# Patient Record
Sex: FEMALE | Race: WHITE | NOT HISPANIC OR LATINO
[De-identification: names, ages, dates, MRNs, and addresses within clinical notes are randomized per-mention and may not be internally consistent; named-entity substitution may affect disease eponyms.]

---

## 2019-06-10 ENCOUNTER — APPOINTMENT (OUTPATIENT)
Dept: ENDOCRINOLOGY | Facility: CLINIC | Age: 49
End: 2019-06-10
Payer: COMMERCIAL

## 2019-06-10 VITALS
SYSTOLIC BLOOD PRESSURE: 110 MMHG | HEIGHT: 60.5 IN | BODY MASS INDEX: 25.63 KG/M2 | DIASTOLIC BLOOD PRESSURE: 70 MMHG | HEART RATE: 77 BPM | WEIGHT: 134 LBS | OXYGEN SATURATION: 98 %

## 2019-06-10 DIAGNOSIS — R63.5 ABNORMAL WEIGHT GAIN: ICD-10-CM

## 2019-06-10 PROCEDURE — 99214 OFFICE O/P EST MOD 30 MIN: CPT

## 2019-06-10 NOTE — HISTORY OF PRESENT ILLNESS
[FreeTextEntry1] : 49 y.o. female with h/o thyroid nodules diagnosed in 2009 here for follow up visit. Was seen in 2010 with b/l FNA on 2/3/2010. Right dominant nodule c/w multinodular goiter and left dominant nodule c/w multinodular goiter. Thyroid ultrasound on 9/22/10 showed multiple b/l nodules and largest on right 1.2 cm and on left 1.1 cm. Most recent thyroid ultrasound on 7/1/16 showed largest nodule on the right measuring 1.1 cm and largest on the left measuring 1.0 cm. Does report neck tenderness on and off 3 times in 2018 and lasted more than couple of days. Does report increase in frequency during this past winter. No dysphagia and no dysphonia. No head or neck RT exposure. Has been dealing with migraines but better. C/o mild fatigue and attributes to decrease in sleep. No hair loss. Otherwise feeling good. Reports weight gain of 8 pounds since 2016. Does exercise 5 days per week and does running and works with a .

## 2019-06-10 NOTE — REVIEW OF SYSTEMS
[Fatigue] : fatigue [Recent Weight Gain (___ Lbs)] : recent [unfilled] ~Ulb weight gain [Constipation] : constipation [Irregular Menses] : irregular menses [Joint Pain] : joint pain [Headache] : headaches [Hot Flashes] : hot flashes [All other systems negative] : All other systems negative [Dysphagia] : no dysphagia [Dysphonia] : no dysphonia [Swelling] : no swelling

## 2019-06-10 NOTE — ASSESSMENT
[FreeTextEntry1] : 49 y.o. female with h/o thyroid nodules and weight gain.\par 1. Thyroid nodules- Reviewed pathophysiology. Patient appears euthyroid. Will check TFTs and antithyroid antibodies. Will check thyroid ultrasound to evaluate nodules. If stable, will monitor.\par 2. Weight gain- Encouraged carbohydrate consistent diet and exercise. Will check TFTs, lipids and CMP\par \par \par If stable, follow up in 1 year

## 2019-06-10 NOTE — PHYSICAL EXAM
[No Acute Distress] : no acute distress [Alert] : alert [Normal Sclera/Conjunctiva] : normal sclera/conjunctiva [EOMI] : extra ocular movement intact [No Accessory Muscle Use] : no accessory muscle use [No LAD] : no lymphadenopathy [Clear to Auscultation] : lungs were clear to auscultation bilaterally [Normal S1, S2] : normal S1 and S2 [Regular Rhythm] : with a regular rhythm [Normal Bowel Sounds] : normal bowel sounds [Not Tender] : non-tender [Not Distended] : not distended [Soft] : abdomen soft [No Clubbing, Cyanosis] : no clubbing  or cyanosis of the fingernails [Normal Reflexes] : deep tendon reflexes were 2+ and symmetric [No Rash] : no rash [Normal Mood] : the mood was normal [Normal Affect] : the affect was normal [No Edema] : there was no peripheral edema [Acanthosis Nigricans] : no acanthosis nigricans [de-identified] : b/l lower pole nodules

## 2019-06-11 LAB
ALBUMIN SERPL ELPH-MCNC: 4.3 G/DL
ALP BLD-CCNC: 66 U/L
ALT SERPL-CCNC: 13 U/L
ANION GAP SERPL CALC-SCNC: 11 MMOL/L
AST SERPL-CCNC: 16 U/L
BASOPHILS # BLD AUTO: 0.06 K/UL
BASOPHILS NFR BLD AUTO: 0.8 %
BILIRUB SERPL-MCNC: 0.3 MG/DL
BUN SERPL-MCNC: 18 MG/DL
CALCIUM SERPL-MCNC: 9.3 MG/DL
CHLORIDE SERPL-SCNC: 101 MMOL/L
CHOLEST SERPL-MCNC: 167 MG/DL
CHOLEST/HDLC SERPL: 2.5 RATIO
CO2 SERPL-SCNC: 25 MMOL/L
CREAT SERPL-MCNC: 0.64 MG/DL
EOSINOPHIL # BLD AUTO: 0.23 K/UL
EOSINOPHIL NFR BLD AUTO: 3.1 %
GLUCOSE SERPL-MCNC: 82 MG/DL
HCT VFR BLD CALC: 38.7 %
HDLC SERPL-MCNC: 67 MG/DL
HGB BLD-MCNC: 12.7 G/DL
IMM GRANULOCYTES NFR BLD AUTO: 0.3 %
LDLC SERPL CALC-MCNC: 85 MG/DL
LYMPHOCYTES # BLD AUTO: 1.42 K/UL
LYMPHOCYTES NFR BLD AUTO: 19.1 %
MAN DIFF?: NORMAL
MCHC RBC-ENTMCNC: 30.2 PG
MCHC RBC-ENTMCNC: 32.8 GM/DL
MCV RBC AUTO: 91.9 FL
MONOCYTES # BLD AUTO: 0.45 K/UL
MONOCYTES NFR BLD AUTO: 6 %
NEUTROPHILS # BLD AUTO: 5.26 K/UL
NEUTROPHILS NFR BLD AUTO: 70.7 %
PLATELET # BLD AUTO: 288 K/UL
POTASSIUM SERPL-SCNC: 4.1 MMOL/L
PROT SERPL-MCNC: 6.5 G/DL
RBC # BLD: 4.21 M/UL
RBC # FLD: 13 %
SODIUM SERPL-SCNC: 137 MMOL/L
T4 FREE SERPL-MCNC: 1 NG/DL
THYROGLOB AB SERPL-ACNC: <20 IU/ML
THYROPEROXIDASE AB SERPL IA-ACNC: 12 IU/ML
TRIGL SERPL-MCNC: 73 MG/DL
TSH SERPL-ACNC: 0.96 UIU/ML
WBC # FLD AUTO: 7.44 K/UL

## 2020-12-04 ENCOUNTER — APPOINTMENT (OUTPATIENT)
Dept: ENDOCRINOLOGY | Facility: CLINIC | Age: 50
End: 2020-12-04
Payer: COMMERCIAL

## 2020-12-04 VITALS
WEIGHT: 126 LBS | HEIGHT: 60.5 IN | HEART RATE: 73 BPM | SYSTOLIC BLOOD PRESSURE: 110 MMHG | TEMPERATURE: 98 F | BODY MASS INDEX: 24.1 KG/M2 | OXYGEN SATURATION: 98 % | DIASTOLIC BLOOD PRESSURE: 70 MMHG

## 2020-12-04 DIAGNOSIS — G43.909 MIGRAINE, UNSPECIFIED, NOT INTRACTABLE, W/OUT STATUS MIGRAINOSUS: ICD-10-CM

## 2020-12-04 PROCEDURE — 99072 ADDL SUPL MATRL&STAF TM PHE: CPT

## 2020-12-04 PROCEDURE — 99213 OFFICE O/P EST LOW 20 MIN: CPT

## 2020-12-04 NOTE — PHYSICAL EXAM
[Alert] : alert [No Acute Distress] : no acute distress [Normal Sclera/Conjunctiva] : normal sclera/conjunctiva [EOMI] : extra ocular movement intact [No LAD] : no lymphadenopathy [Thyroid Not Enlarged] : the thyroid was not enlarged [No Respiratory Distress] : no respiratory distress [Clear to Auscultation] : lungs were clear to auscultation bilaterally [Normal S1, S2] : normal S1 and S2 [Regular Rhythm] : with a regular rhythm [No Edema] : no peripheral edema [Normal Bowel Sounds] : normal bowel sounds [Not Tender] : non-tender [Not Distended] : not distended [Soft] : abdomen soft [Normal Anterior Cervical Nodes] : no anterior cervical lymphadenopathy [No Clubbing, Cyanosis] : no clubbing  or cyanosis of the fingernails [No Rash] : no rash [Normal Reflexes] : deep tendon reflexes were 2+ and symmetric [Normal Affect] : the affect was normal [Normal Mood] : the mood was normal [de-identified] : left lobe nodule palpable

## 2020-12-04 NOTE — REVIEW OF SYSTEMS
[Recent Weight Loss (___ Lbs)] : recent weight loss: [unfilled] lbs [Headaches] : headaches [Hot Flashes] : hot flashes [Negative] : Integumentary [Fatigue] : no fatigue [Swelling] : no swelling [FreeTextEntry9] : plantar fascitis

## 2020-12-04 NOTE — ASSESSMENT
[FreeTextEntry1] : 50 y.o. female with h/o thyroid nodules and migraines.\par 1. Thyroid nodules- Reviewed pathophysiology. Patient is euthyroid. Given continued growth of left upper pole nodule, will refer for ultrasound guided FNA. Reviewed risks and benefits of procedure.\par 2. Migraines- Will refer to neurology\par \par \par Follow up for FNA

## 2020-12-04 NOTE — DATA REVIEWED
[FreeTextEntry1] : Labs\par March 2016\par TSH 0.74 Free T4 0.9\par TPO 8\par Tg ab <1\par \par 11/3/2020\par chol 194 HDL 72  tri 69\par glucose 91\par BUN/cr 14/0.57\par calcium 9.5\par TSH 0.75\par

## 2020-12-04 NOTE — HISTORY OF PRESENT ILLNESS
[FreeTextEntry1] : 50 y.o. female with h/o thyroid nodules diagnosed in 2009 here for follow up visit. Was seen in 2010 with b/l FNA on 2/3/2010. Right dominant nodule c/w multinodular goiter and left dominant nodule c/w multinodular goiter. Thyroid ultrasound on 9/22/10 showed multiple b/l nodules and largest on right 1.2 cm and on left 1.1 cm. Thyroid ultrasound on 7/1/16 showed largest nodule on the right measuring 1.1 cm and largest on the left measuring 1.0 cm. No dysphagia and no dysphonia. No head or neck RT exposure. Has been dealing with migraines but worse. No fatigue. No hair loss. Otherwise feeling good. Reports weight loss. Does exercise 5 days per week. Now postmenopausal with LMP in August 2019. Reports hot flashes. \par \par Thyroid ultrasound on 9/29/2020 shows stable right nodules with largest measuring 1.3 cm in the lower pole and on the left upper pole nodule has increased in size to 1.7 cm which is hypoechoic. No abnormal LNs are seen.

## 2021-01-28 ENCOUNTER — APPOINTMENT (OUTPATIENT)
Dept: ENDOCRINOLOGY | Facility: CLINIC | Age: 51
End: 2021-01-28
Payer: COMMERCIAL

## 2021-01-28 VITALS
DIASTOLIC BLOOD PRESSURE: 62 MMHG | BODY MASS INDEX: 24.29 KG/M2 | WEIGHT: 127 LBS | SYSTOLIC BLOOD PRESSURE: 100 MMHG | OXYGEN SATURATION: 99 % | HEART RATE: 91 BPM | HEIGHT: 60.5 IN | TEMPERATURE: 97.1 F

## 2021-01-28 PROCEDURE — 99213 OFFICE O/P EST LOW 20 MIN: CPT | Mod: 25

## 2021-01-28 PROCEDURE — 99072 ADDL SUPL MATRL&STAF TM PHE: CPT

## 2021-01-28 PROCEDURE — 76536 US EXAM OF HEAD AND NECK: CPT | Mod: 52

## 2021-01-28 NOTE — HISTORY OF PRESENT ILLNESS
[FreeTextEntry1] : 50 y.o. female with h/o thyroid nodules diagnosed in 2009 here for follow up visit. \par Patient of Dr. Awan \par \par Was seen in 2010 with B/L FNA on 2/3/2010. Right dominant nodule c/w multinodular goiter and left dominant nodule c/w multinodular goiter. Thyroid ultrasound on 9/22/10 showed multiple b/l nodules and largest on right 1.2 cm and on left 1.1 cm. Thyroid ultrasound on 7/1/16 showed largest nodule on the right measuring 1.1 cm and largest on the left measuring 1.0 cm. No dysphagia and no dysphonia. No head or neck RT exposure. Has been dealing with migraines but worse. No fatigue. No hair loss. Otherwise feeling good. Reports weight loss. Does exercise 5 days per week. Now postmenopausal with LMP in August 2019. Reports hot flashes. \par \par Thyroid ultrasound on 9/29/2020 shows stable right nodules with largest measuring 1.3 cm in the lower pole and on the left upper pole nodule has increased in size to 1.7 cm which is hypoechoic. \par \par On Ultrasound today the same nodule in the left upper pole is 1.16 x 0.58 x 0.97 cm ( Mixed nodule) \par

## 2021-01-28 NOTE — ASSESSMENT
[FreeTextEntry1] : 50 year old female with h/o thyroid nodules and migraines here for follow-up\par \par 1. Thyroid nodules- Reviewed pathophysiology. Patient is euthyroid. Patient was referred for FNA, however left upper pole mixed nodule ( low suspicion) is now 1.16 x 0.58 x 0.97 cm and is not meeting FNA criteria at this time. \par \par She will be following up with Dr. Awan \par

## 2022-06-30 ENCOUNTER — APPOINTMENT (OUTPATIENT)
Dept: ENDOCRINOLOGY | Facility: CLINIC | Age: 52
End: 2022-06-30

## 2022-06-30 VITALS
DIASTOLIC BLOOD PRESSURE: 72 MMHG | SYSTOLIC BLOOD PRESSURE: 118 MMHG | OXYGEN SATURATION: 99 % | BODY MASS INDEX: 24.54 KG/M2 | HEART RATE: 70 BPM | TEMPERATURE: 97.4 F | WEIGHT: 125 LBS | HEIGHT: 60 IN

## 2022-06-30 PROCEDURE — 99213 OFFICE O/P EST LOW 20 MIN: CPT | Mod: 25

## 2022-06-30 PROCEDURE — 76536 US EXAM OF HEAD AND NECK: CPT

## 2022-06-30 NOTE — IMPRESSION
[FreeTextEntry1] : Goitrous appearance of bilateral thyroid gland.  [FreeTextEntry2] : Multiple mixed and spongiform nodules, not meeting FNA criteria at this time

## 2022-06-30 NOTE — PROCEDURE
[TLM Com e 2008 model, 10-12 MHz frequencies] : multiple real time longitudinal and transverse images were obtained using a high resolution ultrasound with a linear transducer, TLM Com e 2008 model, 10-12 MHz frequencies. All measurements will be reported as longitudinal x tomas-posterior x transverse. [] : a heterogeneous parenchyma [Indistinct] : indistinct [No] : does not have a halo [No calcification] : no calcification [Mid] : mid pole there is a  [Right Thyroid] : right [Thin] : has a thin halo [Heterogeneous] : heterogenous nodule [Left Thyroid] : left [Lower] : lower pole there is a  [Mixed] : mixed [Ovoid] : ovoid in shape [Smooth] : smooth [No calcifications] : no calcifications [Peripheral vascularity] : peripheral vascularity [2] : 2 [No abnormal lymph nodes are seen.] : no abnormal lymph nodes are seen [FreeTextEntry1] : 3.83 x 1.85 x 2.12 [FreeTextEntry5] : 3.83 x 1.49 x 1.53 [FreeTextEntry2] : 0.19 [de-identified] : Overall, 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  [FreeTextEntry3] : 0.67 x 0.42 x 0.6

## 2022-06-30 NOTE — ASSESSMENT
[FreeTextEntry1] : 52 year old female with h/o thyroid nodules and migraines here for follow-up\par \par 1. 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 Awan \par

## 2022-06-30 NOTE — PROCEDURE
[[employer] e 2008 model, 10-12 MHz frequencies] : multiple real time longitudinal and transverse images were obtained using a high resolution ultrasound with a linear transducer, [employer] e 2008 model, 10-12 MHz frequencies. All measurements will be reported as longitudinal x tomas-posterior x transverse. [] : a heterogeneous parenchyma [Indistinct] : indistinct [No] : does not have a halo [No calcification] : no calcification [Mid] : mid pole there is a  [Right Thyroid] : right [Thin] : has a thin halo [Heterogeneous] : heterogenous nodule [Left Thyroid] : left [Lower] : lower pole there is a  [Mixed] : mixed [Ovoid] : ovoid in shape [Smooth] : smooth [No calcifications] : no calcifications [Peripheral vascularity] : peripheral vascularity [2] : 2 [No abnormal lymph nodes are seen.] : no abnormal lymph nodes are seen [FreeTextEntry1] : 3.83 x 1.85 x 2.12 [FreeTextEntry5] : 3.83 x 1.49 x 1.53 [FreeTextEntry2] : 0.19 [de-identified] : Overall, 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  [FreeTextEntry3] : 0.67 x 0.42 x 0.6

## 2022-06-30 NOTE — PHYSICAL EXAM
[Alert] : alert [Well Nourished] : well nourished [No Acute Distress] : no acute distress [Normal Sclera/Conjunctiva] : normal sclera/conjunctiva [EOMI] : extra ocular movement intact [PERRL] : pupils equal, round and reactive to light [Normal Outer Ear/Nose] : the ears and nose were normal in appearance [Normal Hearing] : hearing was normal [Normal TMs] : both tympanic membranes were normal [No Neck Mass] : no neck mass was observed [Thyroid Not Enlarged] : the thyroid was not enlarged [No Respiratory Distress] : no respiratory distress [Clear to Auscultation] : lungs were clear to auscultation bilaterally [Normal S1, S2] : normal S1 and S2 [Normal Rate] : heart rate was normal [Regular Rhythm] : with a regular rhythm [Normal Bowel Sounds] : normal bowel sounds [Not Tender] : non-tender [Soft] : abdomen soft [Normal Gait] : normal gait [No Joint Swelling] : no joint swelling seen [No Rash] : no rash [No Skin Lesions] : no skin lesions [No Motor Deficits] : the motor exam was normal [Normal Reflexes] : deep tendon reflexes were 2+ and symmetric [No Tremors] : no tremors [Oriented x3] : oriented to person, place, and time [Normal Affect] : the affect was normal [Normal Insight/Judgement] : insight and judgment were intact [Normal Mood] : the mood was normal

## 2023-06-12 ENCOUNTER — APPOINTMENT (OUTPATIENT)
Dept: ENDOCRINOLOGY | Facility: CLINIC | Age: 53
End: 2023-06-12
Payer: COMMERCIAL

## 2023-06-12 VITALS
HEIGHT: 60 IN | WEIGHT: 138 LBS | OXYGEN SATURATION: 98 % | SYSTOLIC BLOOD PRESSURE: 120 MMHG | DIASTOLIC BLOOD PRESSURE: 70 MMHG | HEART RATE: 70 BPM | BODY MASS INDEX: 27.09 KG/M2

## 2023-06-12 PROCEDURE — 99213 OFFICE O/P EST LOW 20 MIN: CPT | Mod: 25

## 2023-06-12 PROCEDURE — 76536 US EXAM OF HEAD AND NECK: CPT

## 2023-06-13 ENCOUNTER — NON-APPOINTMENT (OUTPATIENT)
Age: 53
End: 2023-06-13

## 2023-06-13 NOTE — ASSESSMENT
[FreeTextEntry1] : 53 year old female with h/o thyroid nodules and migraines here for follow-up\par \par 1. 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

## 2023-06-13 NOTE — PROCEDURE
[Fon e 2008 model, 10-12 MHz frequencies] : multiple real time longitudinal and transverse images were obtained using a high resolution ultrasound with a linear transducer, Fon e 2008 model, 10-12 MHz frequencies. All measurements will be reported as longitudinal x tomas-posterior x transverse. [] : a mildly vascular parenchyma  [FreeTextEntry1] : 3.8 x 1.7 x 2.1 [FreeTextEntry5] : 3.83 x 1.5 x 1.53 [FreeTextEntry2] : 0.2

## 2023-06-13 NOTE — IMPRESSION
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  [FreeTextEntry2] : Follow up ultrasound in 1 year

## 2023-06-13 NOTE — PHYSICAL EXAM
[Alert] : alert [Well Nourished] : well nourished [No Acute Distress] : no acute distress [Normal Sclera/Conjunctiva] : normal sclera/conjunctiva [PERRL] : pupils equal, round and reactive to light [Normal Outer Ear/Nose] : the ears and nose were normal in appearance [Normal TMs] : both tympanic membranes were normal [Normal Rate] : heart rate was normal [Normal Bowel Sounds] : normal bowel sounds [Soft] : abdomen soft [Normal Gait] : normal gait [No Joint Swelling] : no joint swelling seen [No Rash] : no rash [No Skin Lesions] : no skin lesions [Normal Reflexes] : deep tendon reflexes were 2+ and symmetric [Oriented x3] : oriented to person, place, and time [Normal Insight/Judgement] : insight and judgment were intact

## 2023-06-13 NOTE — REVIEW OF SYSTEMS
[Fatigue] : no fatigue [Decreased Appetite] : appetite not decreased [Recent Weight Gain (___ Lbs)] : no recent weight gain [Recent Weight Loss (___ Lbs)] : no recent weight loss [Visual Field Defect] : no visual field defect [Dysphagia] : no dysphagia [Dry Eyes] : no dryness [Neck Pain] : no neck pain [Dysphonia] : no dysphonia [Nasal Congestion] : no nasal congestion [Chest Pain] : no chest pain [Slow Heart Rate] : heart rate is not slow [Palpitations] : no palpitations [Fast Heart Rate] : heart rate is not fast [Cough] : no cough [Shortness Of Breath] : no shortness of breath [Nausea] : no nausea [Constipation] : no constipation [Vomiting] : no vomiting [Diarrhea] : no diarrhea [Irregular Menses] : regular menses [Polyuria] : no polyuria [Joint Pain] : no joint pain [Headaches] : no headaches [Dizziness] : no dizziness [Tremors] : no tremors [Pain/Numbness of Digits] : no pain/numbness of digits [Depression] : no depression [Polydipsia] : no polydipsia [Cold Intolerance] : no cold intolerance [Easy Bleeding] : no ~M tendency for easy bleeding [Easy Bruising] : no tendency for easy bruising

## 2023-06-19 ENCOUNTER — NON-APPOINTMENT (OUTPATIENT)
Age: 53
End: 2023-06-19

## 2023-06-19 LAB
ALBUMIN SERPL ELPH-MCNC: 4.8 G/DL
ALP BLD-CCNC: 80 U/L
ALT SERPL-CCNC: 14 U/L
ANION GAP SERPL CALC-SCNC: 16 MMOL/L
AST SERPL-CCNC: 20 U/L
BILIRUB SERPL-MCNC: 0.2 MG/DL
BUN SERPL-MCNC: 21 MG/DL
CALCIUM SERPL-MCNC: 9.7 MG/DL
CHLORIDE SERPL-SCNC: 102 MMOL/L
CHOLEST SERPL-MCNC: 219 MG/DL
CO2 SERPL-SCNC: 23 MMOL/L
CREAT SERPL-MCNC: 0.72 MG/DL
EGFR: 101 ML/MIN/1.73M2
ESTIMATED AVERAGE GLUCOSE: 105 MG/DL
FSH SERPL-MCNC: 158 IU/L
GLUCOSE SERPL-MCNC: 95 MG/DL
HBA1C MFR BLD HPLC: 5.3 %
HDLC SERPL-MCNC: 73 MG/DL
LDLC SERPL CALC-MCNC: 122 MG/DL
LH SERPL-ACNC: 65.6 IU/L
NONHDLC SERPL-MCNC: 146 MG/DL
POTASSIUM SERPL-SCNC: 4.4 MMOL/L
PROLACTIN SERPL-MCNC: 8.8 NG/ML
PROT SERPL-MCNC: 6.8 G/DL
SODIUM SERPL-SCNC: 141 MMOL/L
T4 FREE SERPL-MCNC: 1.1 NG/DL
TRIGL SERPL-MCNC: 121 MG/DL
TSH SERPL-ACNC: 0.83 UIU/ML

## 2023-10-10 ENCOUNTER — APPOINTMENT (OUTPATIENT)
Dept: ENDOCRINOLOGY | Facility: CLINIC | Age: 53
End: 2023-10-10
Payer: COMMERCIAL

## 2023-10-10 VITALS
BODY MASS INDEX: 25.19 KG/M2 | SYSTOLIC BLOOD PRESSURE: 142 MMHG | TEMPERATURE: 98 F | DIASTOLIC BLOOD PRESSURE: 81 MMHG | WEIGHT: 129 LBS | HEART RATE: 54 BPM | OXYGEN SATURATION: 98 %

## 2023-10-10 DIAGNOSIS — E04.2 NONTOXIC MULTINODULAR GOITER: ICD-10-CM

## 2023-10-10 PROCEDURE — 99213 OFFICE O/P EST LOW 20 MIN: CPT

## 2025-01-31 ENCOUNTER — APPOINTMENT (OUTPATIENT)
Dept: ENDOCRINOLOGY | Facility: CLINIC | Age: 55
End: 2025-01-31
Payer: COMMERCIAL

## 2025-01-31 ENCOUNTER — TRANSCRIPTION ENCOUNTER (OUTPATIENT)
Age: 55
End: 2025-01-31

## 2025-01-31 VITALS
BODY MASS INDEX: 25.95 KG/M2 | HEIGHT: 60 IN | WEIGHT: 132.19 LBS | OXYGEN SATURATION: 94 % | HEART RATE: 78 BPM | DIASTOLIC BLOOD PRESSURE: 72 MMHG | SYSTOLIC BLOOD PRESSURE: 123 MMHG

## 2025-01-31 DIAGNOSIS — E04.2 NONTOXIC MULTINODULAR GOITER: ICD-10-CM

## 2025-01-31 PROCEDURE — 99213 OFFICE O/P EST LOW 20 MIN: CPT
